# Patient Record
Sex: FEMALE | URBAN - METROPOLITAN AREA
[De-identification: names, ages, dates, MRNs, and addresses within clinical notes are randomized per-mention and may not be internally consistent; named-entity substitution may affect disease eponyms.]

---

## 2020-08-03 ENCOUNTER — APPOINTMENT (RX ONLY)
Dept: URBAN - METROPOLITAN AREA CLINIC 103 | Facility: CLINIC | Age: 35
Setting detail: DERMATOLOGY
End: 2020-08-03

## 2020-08-03 DIAGNOSIS — L64.8 OTHER ANDROGENIC ALOPECIA: ICD-10-CM

## 2020-08-03 DIAGNOSIS — D22 MELANOCYTIC NEVI: ICD-10-CM

## 2020-08-03 PROBLEM — D22.39 MELANOCYTIC NEVI OF OTHER PARTS OF FACE: Status: ACTIVE | Noted: 2020-08-03

## 2020-08-03 PROCEDURE — ? COSMETIC CONSULTATION: PRP

## 2020-08-03 PROCEDURE — 99202 OFFICE O/P NEW SF 15 MIN: CPT

## 2020-08-03 PROCEDURE — ? COUNSELING

## 2020-08-03 ASSESSMENT — LOCATION SIMPLE DESCRIPTION DERM
LOCATION SIMPLE: RIGHT FOREHEAD
LOCATION SIMPLE: LEFT FOREHEAD
LOCATION SIMPLE: LEFT FOREHEAD
LOCATION SIMPLE: RIGHT FOREHEAD
LOCATION SIMPLE: LEFT CHEEK
LOCATION SIMPLE: LEFT CHEEK

## 2020-08-03 ASSESSMENT — LOCATION DETAILED DESCRIPTION DERM
LOCATION DETAILED: LEFT LATERAL FOREHEAD
LOCATION DETAILED: RIGHT SUPERIOR MEDIAL FOREHEAD
LOCATION DETAILED: RIGHT LATERAL FOREHEAD
LOCATION DETAILED: LEFT LATERAL FOREHEAD
LOCATION DETAILED: LEFT SUPERIOR FOREHEAD
LOCATION DETAILED: RIGHT SUPERIOR MEDIAL FOREHEAD
LOCATION DETAILED: LEFT INFERIOR CENTRAL MALAR CHEEK
LOCATION DETAILED: LEFT INFERIOR CENTRAL MALAR CHEEK
LOCATION DETAILED: LEFT SUPERIOR FOREHEAD
LOCATION DETAILED: RIGHT LATERAL FOREHEAD

## 2020-08-03 ASSESSMENT — LOCATION ZONE DERM
LOCATION ZONE: FACE
LOCATION ZONE: FACE

## 2020-08-03 NOTE — PROCEDURE: COUNSELING
Patient Specific Counseling (Will Not Stick From Patient To Patient): Patient was consulted $500 per session.
Detail Level: Detailed
Detail Level: Zone

## 2020-08-17 ENCOUNTER — APPOINTMENT (RX ONLY)
Dept: URBAN - METROPOLITAN AREA CLINIC 103 | Facility: CLINIC | Age: 35
Setting detail: DERMATOLOGY
End: 2020-08-17

## 2020-08-17 DIAGNOSIS — L64.8 OTHER ANDROGENIC ALOPECIA: ICD-10-CM

## 2020-08-17 PROCEDURE — ? COUNSELING

## 2020-08-17 PROCEDURE — ? PLATELET RICH PLASMA INJECTION

## 2020-08-17 ASSESSMENT — LOCATION DETAILED DESCRIPTION DERM
LOCATION DETAILED: LEFT LATERAL FOREHEAD
LOCATION DETAILED: RIGHT FOREHEAD
LOCATION DETAILED: LEFT SUPERIOR FOREHEAD
LOCATION DETAILED: RIGHT LATERAL FOREHEAD
LOCATION DETAILED: RIGHT SUPERIOR MEDIAL FOREHEAD
LOCATION DETAILED: LEFT MEDIAL FRONTAL SCALP
LOCATION DETAILED: LEFT SUPERIOR FOREHEAD
LOCATION DETAILED: LEFT SUPERIOR LATERAL FOREHEAD
LOCATION DETAILED: RIGHT SUPERIOR FOREHEAD
LOCATION DETAILED: RIGHT MEDIAL FOREHEAD

## 2020-08-17 ASSESSMENT — LOCATION SIMPLE DESCRIPTION DERM
LOCATION SIMPLE: RIGHT FOREHEAD
LOCATION SIMPLE: LEFT FOREHEAD
LOCATION SIMPLE: LEFT SCALP
LOCATION SIMPLE: RIGHT FOREHEAD
LOCATION SIMPLE: LEFT FOREHEAD

## 2020-08-17 ASSESSMENT — LOCATION ZONE DERM
LOCATION ZONE: FACE
LOCATION ZONE: FACE
LOCATION ZONE: SCALP

## 2020-08-17 NOTE — PROCEDURE: PLATELET RICH PLASMA INJECTION
Amount Injected At This Location In Cc (Will Not Render If 0): 0
Detail Level: Zone
Post-Care Instructions: After the procedure, take precautions agains sun exposure. Do not apply sunscreen for 12 hours after the procedure. Do not apply make-up for 12 hours after the procedure. Avoid alcohol based toners for 10-14 days. After 2-3 days patients can return to their regular skin regimen.
Price (Use Numbers Only, No Special Characters Or $): 500.00
Which Technique?: Default
Show Additional Techniques: Yes
Consent: Written consent obtained, risks reviewed including but not limited to pain, scarring, infection and incomplete improvement. Patient understands the procedure is cosmetic in nature and will require out of pocket payment. Patient paid $500 for PRP treatment.

## 2020-08-17 NOTE — HPI: ANDROGENIC ALOPECIA
Is This A New Presentation, Or A Follow-Up?: Androgenic Alopecia
How Did The Hair Loss Occur?: sudden in onset
How Severe Is Your Hair Loss?: mild
Previous Labs: No

## 2020-08-17 NOTE — PROCEDURE: COUNSELING
Patient Specific Counseling (Will Not Stick From Patient To Patient): PRP REF:[id number]. LOT: [id number]  Exp: 04-
Detail Level: Detailed

## 2020-09-24 ENCOUNTER — APPOINTMENT (RX ONLY)
Dept: URBAN - METROPOLITAN AREA CLINIC 103 | Facility: CLINIC | Age: 35
Setting detail: DERMATOLOGY
End: 2020-09-24

## 2020-09-24 DIAGNOSIS — L64.8 OTHER ANDROGENIC ALOPECIA: ICD-10-CM

## 2020-09-24 PROCEDURE — ? COUNSELING

## 2020-09-24 PROCEDURE — ? PLATELET RICH PLASMA INJECTION

## 2020-09-24 ASSESSMENT — LOCATION DETAILED DESCRIPTION DERM
LOCATION DETAILED: LEFT LATERAL FOREHEAD
LOCATION DETAILED: RIGHT LATERAL FOREHEAD
LOCATION DETAILED: LEFT SUPERIOR LATERAL FOREHEAD
LOCATION DETAILED: LEFT SUPERIOR FOREHEAD
LOCATION DETAILED: RIGHT MEDIAL FOREHEAD
LOCATION DETAILED: RIGHT SUPERIOR FOREHEAD
LOCATION DETAILED: LEFT LATERAL FOREHEAD
LOCATION DETAILED: RIGHT LATERAL FOREHEAD
LOCATION DETAILED: LEFT SUPERIOR FOREHEAD
LOCATION DETAILED: RIGHT SUPERIOR LATERAL FOREHEAD
LOCATION DETAILED: RIGHT SUPERIOR MEDIAL FOREHEAD
LOCATION DETAILED: LEFT MEDIAL FRONTAL SCALP
LOCATION DETAILED: RIGHT FOREHEAD

## 2020-09-24 ASSESSMENT — LOCATION SIMPLE DESCRIPTION DERM
LOCATION SIMPLE: RIGHT FOREHEAD
LOCATION SIMPLE: LEFT SCALP
LOCATION SIMPLE: RIGHT FOREHEAD
LOCATION SIMPLE: LEFT FOREHEAD
LOCATION SIMPLE: LEFT FOREHEAD

## 2020-09-24 ASSESSMENT — LOCATION ZONE DERM
LOCATION ZONE: SCALP
LOCATION ZONE: FACE
LOCATION ZONE: FACE

## 2020-09-24 NOTE — PROCEDURE: PLATELET RICH PLASMA INJECTION
Amount Injected At This Location In Cc (Will Not Render If 0): 2.5
Depth In Mm (Will Not Render If 0): 0
Location #1: right temporal
Detail Level: Zone
Post-Care Instructions: After the procedure, take precautions agains sun exposure. Do not apply sunscreen for 12 hours after the procedure. Do not apply make-up for 12 hours after the procedure. Avoid alcohol based toners for 10-14 days. After 2-3 days patients can return to their regular skin regimen.
Price (Use Numbers Only, No Special Characters Or $): 500.00
Which Technique?: Default
Show Additional Techniques: Yes
Consent: Written consent obtained, risks reviewed including but not limited to pain, scarring, infection and incomplete improvement. Patient understands the procedure is cosmetic in nature and will require out of pocket payment. Patient paid $500 for PRP treatment.
Location #2: left temporal
Treatment Number (Optional): 2